# Patient Record
Sex: FEMALE | Race: BLACK OR AFRICAN AMERICAN | NOT HISPANIC OR LATINO | Employment: OTHER | URBAN - METROPOLITAN AREA
[De-identification: names, ages, dates, MRNs, and addresses within clinical notes are randomized per-mention and may not be internally consistent; named-entity substitution may affect disease eponyms.]

---

## 2022-01-26 DIAGNOSIS — Z11.59 ENCOUNTER FOR SCREENING FOR OTHER VIRAL DISEASES: Primary | ICD-10-CM

## 2022-02-04 RX ORDER — FOLIC ACID 1 MG/1
1 TABLET ORAL
COMMUNITY
Start: 2021-07-19

## 2022-02-04 RX ORDER — DIVALPROEX SODIUM 500 MG/1
500 TABLET, DELAYED RELEASE ORAL 2 TIMES DAILY
COMMUNITY

## 2022-02-04 RX ORDER — OMEPRAZOLE 40 MG/1
40 CAPSULE, DELAYED RELEASE ORAL
COMMUNITY
Start: 2022-01-23

## 2022-02-04 RX ORDER — SELENIUM 50 MCG
1 TABLET ORAL DAILY
COMMUNITY

## 2022-02-04 RX ORDER — CHOLECALCIFEROL (VITAMIN D3) 50 MCG
1 TABLET ORAL DAILY
COMMUNITY

## 2022-02-04 RX ORDER — IBUPROFEN 800 MG/1
800 TABLET, FILM COATED ORAL
COMMUNITY
Start: 2020-02-17

## 2022-02-04 RX ORDER — LISINOPRIL 10 MG/1
1 TABLET ORAL 2 TIMES DAILY
COMMUNITY
Start: 2021-07-19

## 2022-02-04 RX ORDER — CETIRIZINE HYDROCHLORIDE 10 MG/1
10 TABLET ORAL DAILY
COMMUNITY

## 2022-02-04 RX ORDER — ONDANSETRON 8 MG/1
8 TABLET, ORALLY DISINTEGRATING ORAL
COMMUNITY
Start: 2021-07-19

## 2022-02-04 RX ORDER — LORAZEPAM 1 MG/1
1 TABLET ORAL
COMMUNITY
Start: 2021-11-15

## 2022-02-04 RX ORDER — ZOLPIDEM TARTRATE 10 MG/1
10 TABLET ORAL DAILY
COMMUNITY
Start: 2021-11-15

## 2022-02-04 RX ORDER — NAPROXEN SOD/DIPHENHYDRAMINE 220MG-25MG
1 TABLET ORAL DAILY PRN
COMMUNITY

## 2022-02-04 RX ORDER — DOCUSATE SODIUM 100 MG/1
100 CAPSULE, LIQUID FILLED ORAL
COMMUNITY
Start: 2021-01-13

## 2022-02-07 ENCOUNTER — ANESTHESIA (OUTPATIENT)
Dept: SURGERY | Facility: CLINIC | Age: 37
End: 2022-02-07
Payer: MEDICARE

## 2022-02-07 ENCOUNTER — HOSPITAL ENCOUNTER (OUTPATIENT)
Facility: CLINIC | Age: 37
Discharge: HOME | End: 2022-02-07
Attending: ORTHOPAEDIC SURGERY | Admitting: ORTHOPAEDIC SURGERY
Payer: MEDICARE

## 2022-02-07 ENCOUNTER — ANESTHESIA EVENT (OUTPATIENT)
Dept: SURGERY | Facility: CLINIC | Age: 37
End: 2022-02-07
Payer: MEDICARE

## 2022-02-07 VITALS
HEART RATE: 71 BPM | OXYGEN SATURATION: 96 % | WEIGHT: 233.5 LBS | TEMPERATURE: 97.3 F | RESPIRATION RATE: 15 BRPM | HEIGHT: 64 IN | DIASTOLIC BLOOD PRESSURE: 83 MMHG | SYSTOLIC BLOOD PRESSURE: 125 MMHG | BODY MASS INDEX: 39.86 KG/M2

## 2022-02-07 DIAGNOSIS — Z98.890 S/P RECONSTRUCTION OF ANTERIOR CRUCIATE LIGAMENT: Primary | ICD-10-CM

## 2022-02-07 DIAGNOSIS — S83.519A ACL TEAR: ICD-10-CM

## 2022-02-07 LAB — HCG UR QL: NEGATIVE

## 2022-02-07 PROCEDURE — 250N000009 HC RX 250: Performed by: ANESTHESIOLOGY

## 2022-02-07 PROCEDURE — 370N000017 HC ANESTHESIA TECHNICAL FEE, PER MIN: Performed by: ORTHOPAEDIC SURGERY

## 2022-02-07 PROCEDURE — 258N000003 HC RX IP 258 OP 636: Performed by: ANESTHESIOLOGY

## 2022-02-07 PROCEDURE — 258N000003 HC RX IP 258 OP 636: Performed by: NURSE ANESTHETIST, CERTIFIED REGISTERED

## 2022-02-07 PROCEDURE — 999N000141 HC STATISTIC PRE-PROCEDURE NURSING ASSESSMENT: Performed by: ORTHOPAEDIC SURGERY

## 2022-02-07 PROCEDURE — 250N000009 HC RX 250: Performed by: NURSE ANESTHETIST, CERTIFIED REGISTERED

## 2022-02-07 PROCEDURE — 81025 URINE PREGNANCY TEST: CPT | Performed by: ORTHOPAEDIC SURGERY

## 2022-02-07 PROCEDURE — C1713 ANCHOR/SCREW BN/BN,TIS/BN: HCPCS | Performed by: ORTHOPAEDIC SURGERY

## 2022-02-07 PROCEDURE — 360N000077 HC SURGERY LEVEL 4, PER MIN: Performed by: ORTHOPAEDIC SURGERY

## 2022-02-07 PROCEDURE — 250N000009 HC RX 250: Performed by: ORTHOPAEDIC SURGERY

## 2022-02-07 PROCEDURE — 250N000025 HC SEVOFLURANE, PER MIN: Performed by: ORTHOPAEDIC SURGERY

## 2022-02-07 PROCEDURE — 250N000011 HC RX IP 250 OP 636: Performed by: ORTHOPAEDIC SURGERY

## 2022-02-07 PROCEDURE — 250N000011 HC RX IP 250 OP 636: Performed by: NURSE ANESTHETIST, CERTIFIED REGISTERED

## 2022-02-07 PROCEDURE — 250N000013 HC RX MED GY IP 250 OP 250 PS 637: Performed by: ORTHOPAEDIC SURGERY

## 2022-02-07 PROCEDURE — 250N000011 HC RX IP 250 OP 636: Performed by: REGISTERED NURSE

## 2022-02-07 PROCEDURE — 710N000009 HC RECOVERY PHASE 1, LEVEL 1, PER MIN: Performed by: ORTHOPAEDIC SURGERY

## 2022-02-07 PROCEDURE — 710N000012 HC RECOVERY PHASE 2, PER MINUTE: Performed by: ORTHOPAEDIC SURGERY

## 2022-02-07 PROCEDURE — 258N000001 HC RX 258: Performed by: ORTHOPAEDIC SURGERY

## 2022-02-07 PROCEDURE — 272N000001 HC OR GENERAL SUPPLY STERILE: Performed by: ORTHOPAEDIC SURGERY

## 2022-02-07 DEVICE — IMPLANTABLE DEVICE: Type: IMPLANTABLE DEVICE | Site: KNEE | Status: FUNCTIONAL

## 2022-02-07 RX ORDER — CELECOXIB 200 MG/1
400 CAPSULE ORAL ONCE
Status: COMPLETED | OUTPATIENT
Start: 2022-02-07 | End: 2022-02-07

## 2022-02-07 RX ORDER — HYDROMORPHONE HCL IN WATER/PF 6 MG/30 ML
0.2 PATIENT CONTROLLED ANALGESIA SYRINGE INTRAVENOUS EVERY 5 MIN PRN
Status: DISCONTINUED | OUTPATIENT
Start: 2022-02-07 | End: 2022-02-07 | Stop reason: HOSPADM

## 2022-02-07 RX ORDER — OXYCODONE AND ACETAMINOPHEN 5; 325 MG/1; MG/1
1 TABLET ORAL
Status: DISCONTINUED | OUTPATIENT
Start: 2022-02-07 | End: 2022-02-07 | Stop reason: HOSPADM

## 2022-02-07 RX ORDER — PROPOFOL 10 MG/ML
INJECTION, EMULSION INTRAVENOUS CONTINUOUS PRN
Status: DISCONTINUED | OUTPATIENT
Start: 2022-02-07 | End: 2022-02-07

## 2022-02-07 RX ORDER — EPINEPHRINE 1 MG/ML
INJECTION, SOLUTION INTRAMUSCULAR; SUBCUTANEOUS
Status: DISCONTINUED
Start: 2022-02-07 | End: 2022-02-07 | Stop reason: HOSPADM

## 2022-02-07 RX ORDER — ONDANSETRON 4 MG/1
4 TABLET, ORALLY DISINTEGRATING ORAL EVERY 30 MIN PRN
Status: DISCONTINUED | OUTPATIENT
Start: 2022-02-07 | End: 2022-02-07 | Stop reason: HOSPADM

## 2022-02-07 RX ORDER — ONDANSETRON 2 MG/ML
4 INJECTION INTRAMUSCULAR; INTRAVENOUS EVERY 30 MIN PRN
Status: DISCONTINUED | OUTPATIENT
Start: 2022-02-07 | End: 2022-02-07 | Stop reason: HOSPADM

## 2022-02-07 RX ORDER — DEXAMETHASONE SODIUM PHOSPHATE 4 MG/ML
INJECTION, SOLUTION INTRA-ARTICULAR; INTRALESIONAL; INTRAMUSCULAR; INTRAVENOUS; SOFT TISSUE PRN
Status: DISCONTINUED | OUTPATIENT
Start: 2022-02-07 | End: 2022-02-07

## 2022-02-07 RX ORDER — FENTANYL CITRATE 0.05 MG/ML
25 INJECTION, SOLUTION INTRAMUSCULAR; INTRAVENOUS EVERY 5 MIN PRN
Status: DISCONTINUED | OUTPATIENT
Start: 2022-02-07 | End: 2022-02-07 | Stop reason: HOSPADM

## 2022-02-07 RX ORDER — OXYCODONE HYDROCHLORIDE 5 MG/1
5-10 TABLET ORAL EVERY 4 HOURS PRN
Qty: 26 TABLET | Refills: 0 | Status: SHIPPED | OUTPATIENT
Start: 2022-02-07

## 2022-02-07 RX ORDER — ROPIVACAINE HYDROCHLORIDE 5 MG/ML
INJECTION, SOLUTION EPIDURAL; INFILTRATION; PERINEURAL
Status: DISCONTINUED
Start: 2022-02-07 | End: 2022-02-07 | Stop reason: HOSPADM

## 2022-02-07 RX ORDER — LIDOCAINE HYDROCHLORIDE 20 MG/ML
INJECTION, SOLUTION INFILTRATION; PERINEURAL PRN
Status: DISCONTINUED | OUTPATIENT
Start: 2022-02-07 | End: 2022-02-07

## 2022-02-07 RX ORDER — ALBUTEROL SULFATE 90 UG/1
2 AEROSOL, METERED RESPIRATORY (INHALATION) EVERY 6 HOURS PRN
COMMUNITY

## 2022-02-07 RX ORDER — OXYCODONE HYDROCHLORIDE 5 MG/1
5 TABLET ORAL EVERY 4 HOURS PRN
Status: DISCONTINUED | OUTPATIENT
Start: 2022-02-07 | End: 2022-02-07 | Stop reason: HOSPADM

## 2022-02-07 RX ORDER — CEFAZOLIN SODIUM/WATER 2 G/20 ML
2 SYRINGE (ML) INTRAVENOUS SEE ADMIN INSTRUCTIONS
Status: DISCONTINUED | OUTPATIENT
Start: 2022-02-07 | End: 2022-02-07 | Stop reason: HOSPADM

## 2022-02-07 RX ORDER — PROPOFOL 10 MG/ML
INJECTION, EMULSION INTRAVENOUS PRN
Status: DISCONTINUED | OUTPATIENT
Start: 2022-02-07 | End: 2022-02-07

## 2022-02-07 RX ORDER — FENTANYL CITRATE 50 UG/ML
INJECTION, SOLUTION INTRAMUSCULAR; INTRAVENOUS PRN
Status: DISCONTINUED | OUTPATIENT
Start: 2022-02-07 | End: 2022-02-07

## 2022-02-07 RX ORDER — LIDOCAINE HYDROCHLORIDE 10 MG/ML
INJECTION, SOLUTION INFILTRATION; PERINEURAL
Status: DISCONTINUED
Start: 2022-02-07 | End: 2022-02-07 | Stop reason: HOSPADM

## 2022-02-07 RX ORDER — SODIUM CHLORIDE, SODIUM LACTATE, POTASSIUM CHLORIDE, CALCIUM CHLORIDE 600; 310; 30; 20 MG/100ML; MG/100ML; MG/100ML; MG/100ML
INJECTION, SOLUTION INTRAVENOUS CONTINUOUS
Status: DISCONTINUED | OUTPATIENT
Start: 2022-02-07 | End: 2022-02-07 | Stop reason: HOSPADM

## 2022-02-07 RX ORDER — CEFAZOLIN SODIUM/WATER 2 G/20 ML
2 SYRINGE (ML) INTRAVENOUS
Status: COMPLETED | OUTPATIENT
Start: 2022-02-07 | End: 2022-02-07

## 2022-02-07 RX ORDER — ACETAMINOPHEN 325 MG/1
975 TABLET ORAL ONCE
Status: COMPLETED | OUTPATIENT
Start: 2022-02-07 | End: 2022-02-07

## 2022-02-07 RX ORDER — ONDANSETRON 2 MG/ML
INJECTION INTRAMUSCULAR; INTRAVENOUS PRN
Status: DISCONTINUED | OUTPATIENT
Start: 2022-02-07 | End: 2022-02-07

## 2022-02-07 RX ORDER — MAGNESIUM HYDROXIDE 1200 MG/15ML
LIQUID ORAL PRN
Status: DISCONTINUED | OUTPATIENT
Start: 2022-02-07 | End: 2022-02-07 | Stop reason: HOSPADM

## 2022-02-07 RX ADMIN — PHENYLEPHRINE HYDROCHLORIDE 100 MCG: 10 INJECTION INTRAVENOUS at 11:16

## 2022-02-07 RX ADMIN — ROPIVACAINE HYDROCHLORIDE 20 ML: 5 INJECTION, SOLUTION EPIDURAL; INFILTRATION; PERINEURAL at 10:35

## 2022-02-07 RX ADMIN — PROPOFOL 200 MG: 10 INJECTION, EMULSION INTRAVENOUS at 10:58

## 2022-02-07 RX ADMIN — LIDOCAINE HYDROCHLORIDE 100 MG: 20 INJECTION, SOLUTION INFILTRATION; PERINEURAL at 10:58

## 2022-02-07 RX ADMIN — DEXAMETHASONE SODIUM PHOSPHATE 4 MG: 4 INJECTION, SOLUTION INTRA-ARTICULAR; INTRALESIONAL; INTRAMUSCULAR; INTRAVENOUS; SOFT TISSUE at 11:08

## 2022-02-07 RX ADMIN — CELECOXIB 400 MG: 200 CAPSULE ORAL at 09:27

## 2022-02-07 RX ADMIN — Medication 8 MCG: at 13:55

## 2022-02-07 RX ADMIN — Medication 2 G: at 10:50

## 2022-02-07 RX ADMIN — HYDROMORPHONE HYDROCHLORIDE 0.5 MG: 1 INJECTION, SOLUTION INTRAMUSCULAR; INTRAVENOUS; SUBCUTANEOUS at 14:03

## 2022-02-07 RX ADMIN — SODIUM CHLORIDE, POTASSIUM CHLORIDE, SODIUM LACTATE AND CALCIUM CHLORIDE: 600; 310; 30; 20 INJECTION, SOLUTION INTRAVENOUS at 14:16

## 2022-02-07 RX ADMIN — SODIUM CHLORIDE, POTASSIUM CHLORIDE, SODIUM LACTATE AND CALCIUM CHLORIDE: 600; 310; 30; 20 INJECTION, SOLUTION INTRAVENOUS at 10:03

## 2022-02-07 RX ADMIN — Medication 4 MCG: at 14:18

## 2022-02-07 RX ADMIN — ONDANSETRON 4 MG: 2 INJECTION INTRAMUSCULAR; INTRAVENOUS at 14:25

## 2022-02-07 RX ADMIN — Medication 8 MCG: at 14:16

## 2022-02-07 RX ADMIN — PHENYLEPHRINE HYDROCHLORIDE 100 MCG: 10 INJECTION INTRAVENOUS at 12:22

## 2022-02-07 RX ADMIN — ACETAMINOPHEN 975 MG: 325 TABLET, FILM COATED ORAL at 09:27

## 2022-02-07 RX ADMIN — FENTANYL CITRATE 100 MCG: 50 INJECTION, SOLUTION INTRAMUSCULAR; INTRAVENOUS at 10:58

## 2022-02-07 RX ADMIN — PHENYLEPHRINE HYDROCHLORIDE 100 MCG: 10 INJECTION INTRAVENOUS at 12:04

## 2022-02-07 RX ADMIN — PROPOFOL 50 MCG/KG/MIN: 10 INJECTION, EMULSION INTRAVENOUS at 10:58

## 2022-02-07 RX ADMIN — FENTANYL CITRATE 50 MCG: 50 INJECTION, SOLUTION INTRAMUSCULAR; INTRAVENOUS at 14:39

## 2022-02-07 ASSESSMENT — MIFFLIN-ST. JEOR: SCORE: 1744.15

## 2022-02-07 ASSESSMENT — LIFESTYLE VARIABLES: TOBACCO_USE: 0

## 2022-02-07 ASSESSMENT — ENCOUNTER SYMPTOMS: SEIZURES: 1

## 2022-02-07 NOTE — ANESTHESIA PROCEDURE NOTES
Adductor canal and Femoral Procedure Note    Pre-Procedure   Staff -        Anesthesiologist:  Carlos Hui MD       Performed By: Anesthesiologist       Location: pre-op       Pre-Anesthestic Checklist: patient identified, IV checked, site marked, risks and benefits discussed, informed consent, monitors and equipment checked, pre-op evaluation, at physician/surgeon's request and post-op pain management  Timeout:       Correct Patient: Yes        Correct Procedure: Yes        Correct Site: Yes        Correct Position: Yes        Correct Laterality: Yes        Site Marked: Yes  Procedure Documentation  Procedure: Adductor canal, Femoral       Patient Position: supine       Patient Prep/Sterile Barriers: mask, no-touch technique utilized       Skin prep: Chloraprep       Local skin infiltrated with 3 mL of 1% lidocaine.        Needle Type: insulated and short bevel       Needle Gauge: 20.        Needle Length (millimeters): 100        Ultrasound guided       1. Ultrasound was used to identify targeted nerve, plexus, vascular marker, or fascial plane and place a needle adjacent to it in real-time.       2. Ultrasound was used to visualize the spread of anesthetic in close proximity to the above referenced structure.       3. A permanent image is entered into the patient's record.    Assessment/Narrative         The placement was negative for: blood aspirated, painful injection and site bleeding       Paresthesias: No.    Test dose of mL at.         Test dose negative, 3 minutes after injection, for signs of intravascular, subdural, or intrathecal injection.     Bolus given via needle..        Secured via.        Insertion/Infusion Method: Single Shot       Complications: none    Medication(s) Administered   Ropivacaine 0.5% w/ 1:400K Epi (Injection), 20 mL   Comments:  20 cc of 0.5% Ropivacaine with epinephrine (1:400K) injected on the anterior side of the femoral artery, in close proximity to the femoral nerve  branches via the adductor canal approach.      Ultrasound Interpretation, Peripheral Nerve Block    1. Under ultrasound guidance, the needle was inserted and placed in close proximity to the target nerve(s).  2. Ultrasound was also used to visualize the spread of the anesthetic in close proximity to the nerve(s) being blocked.  Local anesthetic was administered in incremental doses, with intermittent negative aspiration.    3. The nerve(s) appeared anatomically normal.  4. There were no apparent abnormal pathological findings.  5. A permanent ultrasound image was saved in the patient's record.    Pt tolerated well.    No complications.      The surgeon has given a verbal order transferring care of this patient to me for the performance of a regional analgesia block for post-op pain control. It is requested of me because I am uniquely trained and qualified to perform this block and the surgeon is neither trained nor qualified to perform this procedure.

## 2022-02-07 NOTE — BRIEF OP NOTE
[facility]    Brief Operative Note    Pre-operative diagnosis: ACL tear [S83.519A]  Post-operative diagnosis Chronic ACL  tear with recurrent instability and chronic complex unstable tears of the medial and lateral menisci    Procedure: Procedure(s):  LEFT KNEE ARTHROSCOPIC ASSISTED ANTERIOR CRUCIATE LIGAMENT RECONSTRUCTION  BONE-PATELLA-BONE AUTOGRAFT  PARTIAL MEDIAL AND LATERAL MENISCECTOMY  Surgeon: Surgeon(s) and Role:     * Humberto Panchal MD - Primary     * Dominic Thomas - Assisting  Anesthesia: Other   Estimated Blood Loss: Less than 50 ml    Drains: None  Specimens: * No specimens in log *  Findings:   None.  Complications: None.  Implants:   Implant Name Type Inv. Item Serial No.  Lot No. LRB No. Used Action   SOFTSILK 1.5 SCREW, 7MM X 20MM    [employer] [id number] Left 1 Implanted   SOFTSILK 1.5 SCRW, 8MM X 20MM     [employer] [id number] Left 1 Implanted

## 2022-02-07 NOTE — OR NURSING
Discharge instructions reviewed with friend Milton via phone who will be with her tonight. Printed form sent with patient. Neither have any further questions. Waiting for Medical Transport to take her home. Milton confirms he will be there to accept patient.

## 2022-02-07 NOTE — ANESTHESIA PREPROCEDURE EVALUATION
Prior to Admission medications    Medication Sig Start Date End Date Taking? Authorizing Provider   acetaminophen-codeine (TYLENOL #3) 300-30 MG per tablet Take 1-2 tablets by mouth every 4 hours as needed for moderate pain   Yes Reported, Patient   albuterol (PROAIR HFA/PROVENTIL HFA/VENTOLIN HFA) 108 (90 Base) MCG/ACT inhaler Inhale 2 puffs into the lungs every 6 hours as needed for shortness of breath / dyspnea or wheezing   Yes Reported, Patient   cetirizine (ZYRTEC) 10 MG tablet Take 10 mg by mouth daily   Yes Reported, Patient   divalproex sodium delayed-release (DEPAKOTE) 500 MG DR tablet Take 500 mg by mouth 2 times daily   Yes Reported, Patient   docusate sodium (DSS) 100 MG capsule Take 100 mg by mouth 1/13/21  Yes Reported, Patient   FIBER SELECT GUMMIES PO Take 2 chewable tablet by mouth daily   Yes Reported, Patient   folic acid (FOLVITE) 1 MG tablet Take 1 mg by mouth 7/19/21  Yes Reported, Patient   ibuprofen (ADVIL/MOTRIN) 800 MG tablet Take 800 mg by mouth 2/17/20  Yes Reported, Patient   Lactobacillus (ACIDOPHILUS) CAPS Take 1 capsule by mouth daily   Yes Reported, Patient   lisinopril (ZESTRIL) 10 MG tablet Take 1 tablet by mouth 2 times daily 7/19/21  Yes Reported, Patient   LORazepam (ATIVAN) 1 MG tablet Take 1 mg by mouth 11/15/21  Yes Reported, Patient   Naproxen Sod-diphenhydrAMINE (ALEVE PM) 220-25 MG TABS Take 1 tablet by mouth daily as needed   Yes Reported, Patient   omeprazole (PRILOSEC) 40 MG DR capsule Take 40 mg by mouth 1/23/22  Yes Reported, Patient   ondansetron (ZOFRAN-ODT) 8 MG ODT tab Place 8 mg under the tongue 7/19/21  Yes Reported, Patient   sertraline (ZOLOFT) 50 MG tablet Take 50 mg by mouth daily    Yes Reported, Patient   vitamin D3 (CHOLECALCIFEROL) 50 mcg (2000 units) tablet Take 1 tablet by mouth daily   Yes Reported, Patient   zolpidem (AMBIEN) 10 MG tablet Take 10 mg by mouth daily 11/15/21  Yes Reported, Patient     Current Facility-Administered Medications Ordered  in Epic   Medication Dose Route Frequency Last Rate Last Admin     ceFAZolin Sodium (ANCEF) injection 2 g  2 g Intravenous Pre-Op/Pre-procedure x 1 dose         ceFAZolin Sodium (ANCEF) injection 2 g  2 g Intravenous See Admin Instructions         fentaNYL (PF) (SUBLIMAZE) injection 25 mcg  25 mcg Intravenous Q5 Min PRN         HYDROmorphone (DILAUDID) injection 0.2 mg  0.2 mg Intravenous Q5 Min PRN         lactated ringers infusion   Intravenous Continuous         ondansetron (ZOFRAN-ODT) ODT tab 4 mg  4 mg Oral Q30 Min PRN        Or     ondansetron (ZOFRAN) injection 4 mg  4 mg Intravenous Q30 Min PRN         oxyCODONE (ROXICODONE) tablet 5 mg  5 mg Oral Q4H PRN         prochlorperazine (COMPAZINE) injection 5 mg  5 mg Intravenous Q6H PRN        Or     prochlorperazine (COMPAZINE) injection 10 mg  10 mg Intravenous Q6H PRN         No current [facility]-ordered outpatient medications on file.       lactated ringers       No results for input(s): ABO, RH in the last 32870 hours.  Recent Labs   Lab Test 22  0900   HCG Negative     No results found for this or any previous visit (from the past 744 hour(s)).Anesthesia Pre-Procedure Evaluation    Patient: Megan Mcdonald   MRN: [id number] : 1985        Preoperative Diagnosis: ACL tear [S83.519A]    Procedure : Procedure(s):  LEFT KNEE ARTHROSCOPIC ASSISTED ANTERIOR CRUCIATE LIGAMENT RECONSTRUCTION  BONE-PATELLA-BONE AUTOGRAFT  PARTIAL MEDIAL AND LATERAL MENISCECTOMY VERSUS POSSIBLE REPAIR          No past medical history on file.   Past Surgical History:   Procedure Laterality Date     OTHER SURGICAL HISTORY      right acl repair      Allergies   Allergen Reactions     Abilify [Aripiprazole] Other (See Comments)     Seizures     Haldol [Haloperidol] Other (See Comments)     seizure     Clindamycin Rash      Social History     Tobacco Use     Smoking status: Never Smoker     Smokeless tobacco: Not on file   Substance Use Topics     Alcohol use: No      Wt  Readings from Last 1 Encounters:   02/07/22 105.9 kg (233 lb 8 oz)        Anesthesia Evaluation   Pt has had prior anesthetic.     No history of anesthetic complications       ROS/MED HX  ENT/Pulmonary:    (-) tobacco use   Neurologic:     (+) seizures, last seizure: remote, features: grand mal,     Cardiovascular:  - neg cardiovascular ROS     METS/Exercise Tolerance:     Hematologic:       Musculoskeletal:       GI/Hepatic:     (+) GERD, Asymptomatic on medication,     Renal/Genitourinary:       Endo:       Psychiatric/Substance Use:       Infectious Disease:       Malignancy:       Other:            Physical Exam    Airway        Mallampati: I    Neck ROM: full     Respiratory Devices and Support         Dental  no notable dental history         Cardiovascular   cardiovascular exam normal          Pulmonary   pulmonary exam normal                OUTSIDE LABS:  CBC:   Lab Results   Component Value Date    WBC 5.2 03/30/2015    HGB 13.6 03/30/2015    HCT 38.9 03/30/2015     03/30/2015     BMP:   Lab Results   Component Value Date     03/30/2015    POTASSIUM 3.8 03/30/2015    CHLORIDE 107 03/30/2015    CO2 27 03/30/2015    BUN 12 03/30/2015    CR 0.75 03/30/2015    GLC 71 03/30/2015     COAGS: No results found for: PTT, INR, FIBR  POC:   Lab Results   Component Value Date    HCG Negative 02/07/2022     HEPATIC: No results found for: ALBUMIN, PROTTOTAL, ALT, AST, GGT, ALKPHOS, BILITOTAL, BILIDIRECT, MEHRAN  OTHER:   Lab Results   Component Value Date    NAVEEN 8.5 03/30/2015       Anesthesia Plan    ASA Status:  2   NPO Status:  NPO Appropriate    Anesthesia Type: General.     - Airway: LMA   Induction: Intravenous.   Maintenance: Balanced.        Consents    Anesthesia Plan(s) and associated risks, benefits, and realistic alternatives discussed. Questions answered and patient/representative(s) expressed understanding.    - Discussed:     - Discussed with:  Patient         Postoperative Care       PONV  prophylaxis: Ondansetron (or other 5HT-3)     Comments:    Other Comments: Pt requesting general anesthesia. SAB was offered.            Carlos Hui MD

## 2022-02-07 NOTE — OP NOTE
Procedure Date: 02/07/2022    PREOPERATIVE DIAGNOSES:    1.  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  2.  Chronic unstable posterior horn tear, medial meniscus.  3.  Chronic unstable posterior horn tear, lateral meniscus.    PROCEDURES:    1.  Diagnostic arthroscopy, left knee.  2.  Arthroscopic partial medial meniscectomy.  3.  Arthroscopic partial lateral meniscectomy.  4.  Arthroscopic-assisted anterior cruciate ligament reconstruction.  Bone-patellar tendon-bone autograft.    SURGEON:  Humberto Panchal MD    FIRST ASSISTANT:  Dominic Thomas, orthopedic assistant.    IMPLANTS:  [employer] interference screws x2:  7 x 20 -- femur, 8 x 20 -- tibia.    INDICATIONS FOR PROCEDURE:  Megan Mcdonald is a 36-year-old woman who initially injured her left knee 5 years ago, sustaining an anterior cruciate ligament rupture, as well as medial and lateral meniscus tears.  She has had persistent pain, and recurrent instability, as well as prominent mechanical symptoms including catching and locking for the last 5 years.  She has considered a surgical intervention on multiple occasions over the last 5 years, but these were always discontinued due to either social or medical reasons.  She is now ready to consider surgical intervention.  She has undergone an extensive conservative treatment approach, but unfortunately her symptoms have persisted.  Indications, risks, goals, and expectations of left knee surgery were reviewed in detail preoperatively.    DESCRIPTION OF PROCEDURE:  Following induction of an adductor canal femoral nerve block as well as general anesthesia, the left lower extremity was prepped and draped in the usual sterile fashion.  Examination under anesthesia of both lower extremities did reveal a positive Lachman and pivot shift test on the left.  Lachman and pivot shift tests were negative on the right.  Of note, she is 7 years status post right knee ACL  "reconstruction performed by Dr. Dominic Maher in 2014.    An anterolateral portal was established for the arthroscope cannula.  A superior medial approach was used for the Veress needle for outflow.  An anteromedial portal was established under direct vision using a spinal needle followed by an 11-blade knife followed by arthroscopy probe.  Diagnostic arthroscopy was begun in the suprapatellar pouch.  The articular cartilage, surfaces of the patella and trochlea were normal.  The medial and lateral gutters were free of loose bodies.  The scope was brought down the lateral gutter and back to the popliteal recess, which was normal.  Intercondylar notch was examined.  There was chronic full thickness ACL rupture with a positive \"empty wall sign.\"  There remained some residual ligament attached to the tibial insertion, but otherwise there was essentially absence of the proximal 1/2 of the anterior cruciate ligament.  The posterior cruciate ligament was intact.    The medial compartment was examined and probed.  There was mild grade 2 chondromalacia involving approximately 1 cm in diameter of the medial femoral condyle.  A light medial femoral chondroplasty was performed to improve visualization.  There was a chronic displaced horizontal flap tear that remained attached to the mid body.  In addition, there was complex tearing involving both radial and horizontal components at the posterior horn.  An arthroscopic partial medial meniscectomy was then performed using the Acufex upbiter, as well as curved 4.5 mm synovial resector.  All loose unstable flaps were trimmed.    The knee was then placed in a figure-of-four position and the lateral compartment was examined and probed.  There was a grossly unstable posterior horn tear with both radial and horizontal components.  This resulted in a large unstable flap that was displaceable anteriorly into the compartment.  Arthroscopic partial lateral meniscectomy was performed, " essentially removing most of the posterior horn.  The mid body remained stable to probing just anterior to the popliteus tendon.  This was then preserved as was the anterior horn.  The articular cartilage surfaces of the lateral compartment were normal.    The intercondylar notch was then debrided, removing synovitis and hypertrophic ligamentum mucosum.  This revealed significant intercondylar notch stenosis.  A 5.0 mm spherical bur was then used to perform a notchplasty on the lateral wall of the intercondylar notch, removing 3-4 mm of bone anteriorly and then tapering posteriorly so that no bone was removed from the posterior one half of the intercondylar notch.  The over the top position was carefully identified with an arthroscopy probe.    Attention was then turned to the graft harvest.  The leg was exsanguinated with an Esmarch bandage and the tourniquet inflated to 275 mmHg.  A linear incision was made from the inferior pole of the patella to the medial border of the tibial tubercle.  Full thickness skin flaps were elevated above the patellar tendon and the patella.  The peritenon was incised sharply over the patellar tendon and carefully reflected.  A 10 mm wide strip of patellar tendon from the central aspect was harvested sharply.  Bone blocks were fashioned from the patella and tibial tubercle using a microsagittal saw.  The graft was carefully extracted using an osteotome and taken to the back table for preparation.  An antibiotic soaked sponge was placed in the knee to keep the soft tissues moist.    The graft was then prepared with a rongeur.  Bone was carefully preserved for later patellar defect grafting.  The patellar bone block fit nicely into a 10 mm sizing tube and the tibial tubercle bone block fit nicely into a 9 mm sizing tube.  Three drill holes were made in each bone block using an 0.054 K-wire and three #1 Ethibond sutures were placed through each bone block for graft passage.  The tendon  bone interface was marked with a sterile marking pen for the femoral bone block.  The graft was placed in an antibiotic-soaked sponge in a safe position on the back table.    The scope was then returned to the knee.  An accessory anteromedial portal was made beneath the subcutaneous flap.  This was then used for the Acufex tibial aimer.  This was then placed at the previously identified site for the tibial tunnel.  This was 2 mm anterior to the leading edge of the anterior horn of the lateral meniscus and bias to the medial side of the tibial footprint adjacent to the medial tibial spine.  The guide pin was then placed and confirmed in an anatomic position.  The tibial tunnel was then fashioned with the 10 mm rigid reamer.  Bone reamings were preserved for later patellar defect grafting.    The scope was then switched to the anteromedial portal.  The 9 mm pinpoint guide was placed in the anterolateral portal and on the lateral wall of the intercondylar notch.  This was placed to provide a 3-4 mm bone bridge posteriorly between the edge of the tunnel and the articular cartilage margin.  This was positioned proximally, so that 40% of the bone was visible proximal to the tibial tunnel and 60% of the bone proximal to the femoral tunnel and 60% remained visible distal to the femoral tunnel.  The awl was then used in the accessory anteromedial portal to make a small hole in the center of the pinpoint guide.  This was carefully inspected and confirmed in the anatomic position.  The flexible femoral guide pin was then placed in its guide and passed from distal to proximal out the lateral aspect of the knee.  The flexible femoral reamer was then used to fashion the femoral tunnel.  This was placed to a depth of 35-40 mm.  Bone reamings were irrigated from the knee.  The tunnel was carefully inspected with the scope in the anteromedial portal and revealed circumferential bone in an appropriate position.  The notching tool was  then used in the anteromedial portal to create a notch for the interference screw guide pin.  The passing sutures were then placed in the eyelet of the Beath pin and advanced from distal to proximal.  These were retrieved in the tibial tunnel and used to pass the graft also from distal to proximal.  The femoral bone block was countersunk approximately 1 mm up into the tunnel to improve the tibial bone block positioning within the tibial tunnel.  The femoral bone block was stabilized with a 7 x 20 mm cannulated interference screw.  This achieved excellent purchase.  The knee was cycled through multiple ranges of motion.  There was no sign of lateral wall or intercondylar roof impingement, including with the knee in full extension.  There was no pistoning of the tibial tunnel within the tibial bone block.  The knee was then placed in 5 degrees of flexion with a gentle posterior drawer on the knee to tension the posterior cruciate ligament.  The ACL graft was tensioned and then stabilized with an 8 x 20 mm interference screw in the tibial tunnel.  This achieved excellent purchase.  Lachman's test was now negative.    Autograft was used to graft the patellar donor site.  This was contained by closing the peritenon.  The patellar tendon and peritenon were then closed in separate layers using interrupted 2-0 Vicryl suture.  Subcutaneous tissue was closed with interrupted 2-0 Vicryl and skin with running 4-0 Monocryl in a subcuticular fashion, followed by Steri-Strips.  A sterile dressing was applied and the left lower extremity was placed into a knee immobilizer.    COMPLICATIONS:  None known.    ESTIMATED BLOOD LOSS:  10 mL    ANTIBIOTICS:  Ancef 2 grams IV preoperatively.    TOTAL TOURNIQUET TIME:  107 minutes.    The patient returned to the PACU in stable condition.    Humberto Panchal MD        D: 02/07/2022   T: 02/07/2022   MT: BRADY    Name:     SILVIA JARRETT  MRN:      [id number]        Account:         [id number]   :      1985           Procedure Date: 2022     Document: [id number]

## 2022-02-07 NOTE — ANESTHESIA POSTPROCEDURE EVALUATION
Patient: Megan Mcdonald    Procedure: Procedure(s):  LEFT KNEE ARTHROSCOPIC ASSISTED ANTERIOR CRUCIATE LIGAMENT RECONSTRUCTION  BONE-PATELLA-BONE AUTOGRAFT  PARTIAL MEDIAL AND LATERAL MENISCECTOMY       Diagnosis:ACL tear [S83.519A]  Diagnosis Additional Information: No value filed.    Anesthesia Type:  General    Note:  Disposition: Outpatient   Postop Pain Control: Uneventful            Sign Out: Well controlled pain   PONV: No   Neuro/Psych: Uneventful            Sign Out: Acceptable/Baseline neuro status   Airway/Respiratory: Uneventful            Sign Out: Acceptable/Baseline resp. status   CV/Hemodynamics: Uneventful            Sign Out: Acceptable CV status; No obvious hypovolemia; No obvious fluid overload   Other NRE: NONE   DID A NON-ROUTINE EVENT OCCUR? No           Last vitals:  Vitals Value Taken Time   /83 02/07/22 1600   Temp 36.3  C (97.3  F) 02/07/22 1600   Pulse 77 02/07/22 1611   Resp 0 02/07/22 1611   SpO2 97 % 02/07/22 1611   Vitals shown include unvalidated device data.    Electronically Signed By: Carlos Hui MD  February 7, 2022  4:24 PM

## 2022-02-07 NOTE — DISCHARGE INSTRUCTIONS
Today you were given 975 mg of Tylenol at 0930.  The recommended daily maximum dose is 4000 mg.     While you were at the hospital today you received Celebrex at 0930, an antiinflammatory medication similar to Ibuprofen.  You should not take other antiinflammatory medication, such as Ibuprofen, Motrin, Advil, Aleve, Naprosyn, etc, for at least 8 hours.       Per Dr. Panchal knee immobilizer and crutches for all transfers and movement until you see him for followup.      Same Day Surgery Discharge Instructions for  Sedation and General Anesthesia       It's not unusual to feel dizzy, light-headed or faint for up to 24 hours after surgery or while taking pain medication.  If you have these symptoms: sit for a few minutes before standing and have someone assist you when you get up to walk or use the bathroom.      You should rest and relax for the next 24 hours. We recommend you make arrangements to have an adult stay with you for at least 24 hours after your discharge.  Avoid hazardous and strenuous activity.      DO NOT DRIVE any vehicle or operate mechanical equipment for 24 hours following the end of your surgery.  Even though you may feel normal, your reactions may be affected by the medication you have received.      Do not drink alcoholic beverages for 24 hours following surgery.       Slowly progress to your regular diet as you feel able. It's not unusual to feel nauseated and/or vomit after receiving anesthesia.  If you develop these symptoms, drink clear liquids (apple juice, ginger ale, broth, 7-up, etc. ) until you feel better.  If your nausea and vomiting persists for 24 hours, please notify your surgeon.        All narcotic pain medications, along with inactivity and anesthesia, can cause constipation. Drinking plenty of liquids and increasing fiber intake will help.      For any questions of a medical nature, call your surgeon.      Do not make important decisions for 24 hours.      If you had general  anesthesia, you may have a sore throat for a couple of days related to the breathing tube used during surgery.  You may use Cepacol lozenges to help with this discomfort.  If it worsens or if you develop a fever, contact your surgeon.       If you feel your pain is not well managed with the pain medications prescribed by your surgeon, please contact your surgeon's office to let them know so they can address your concerns.       CoVid 19 Information    We want to give you information regarding Covid. Please consult your primary care provider with any questions you might have.     Patient who have symptoms (cough, fever, or shortness of breath), need to isolate for 7 days from when symptoms started OR 72 hours after fever resolves (without fever reducing medications) AND improvement of respiratory symptoms (whichever is longer).      Isolate yourself at home (in own room/own bathroom if possible)    Do Not allow any visitors    Do Not go to work or school    Do Not go to [place],  centers, shopping, or other public places.    Do Not shake hands.    Avoid close and intimate contact with others (hugging, kissing).    Follow CDC recommendations for household cleaning of frequently touched services.     After the initial 7 days, continue to isolate yourself from household members as much as possible. To continue decrease the risk of community spread and exposure, you and any members of your household should limit activities in public for 14 days after starting home isolation.     You can reference the following CDC link for helpful home isolation/care tips:  https://www.cdc.gov/coronavirus/2019-ncov/downloads/10Things.pdf    Protect Others:    Cover Your Mouth and Nose with a mask, disposable tissue or wash cloth to avoid spreading germs to others.    Wash your hands and face frequently with soap and water    Call Your Primary Doctor If: Breathing difficulty develops or you become worse.    For more information  "about COVID19 and options for caring for yourself at home, please visit the CDC website at https://www.cdc.gov/coronavirus/2019-ncov/about/steps-when-sick.html  For more options for care at [facility], please visit our website at [email or web address]        [facility]      DISCHARGE INSTRUCTIONS FOLLOWING   REGIONAL BLOCK ANESTHESIA      Numbness or lack of feeling in the arm/leg that was operated may last up to 24 hours.  The average time is usually 10-15 hours.  You may not be able to lift or move the arm or leg where the operation was by itself during that time.  Long-acting local anesthetic medicines were used to give you long-lasting pain relief.    Wear a sling until your arm is completely \"awake\"    Avoid bumping your arm, leg or foot while it is numb    Avoid extremes of hot or cold while it is numb    Remain quiet and restful the day of surgery.  Resume normal activities gradually over the next day or so as advise by your surgeon.    Do not drive or operate  Any machinery until your extremity is full  \"awake\"        You will have a tingling and prickly sensation in your arm/leg as the feeling begins to return; you can also expect some discomfort. The amount of discomfort is unpredictable, but if you have more pain that can be controlled with the pain medication you received, you should contact your surgeon.  Start to take your pain pills as soon as you start to feel any discomfort or pain.                  Reasons to contact your surgeon:    1. Signs of possible infection: Check your incision daily for redness, swelling, warmth, red streaks or foul drainage.   2. Elevated temperature.  3. Pain not controlled with pain medication and/or rest.   4. Uncontrolled nausea or vomiting.  5. Any questions or concerns.    "

## 2022-02-07 NOTE — ANESTHESIA CARE TRANSFER NOTE
Patient: Megan Mcdonald    Procedure: Procedure(s):  LEFT KNEE ARTHROSCOPIC ASSISTED ANTERIOR CRUCIATE LIGAMENT RECONSTRUCTION  BONE-PATELLA-BONE AUTOGRAFT  PARTIAL MEDIAL AND LATERAL MENISCECTOMY       Diagnosis: ACL tear [S83.519A]  Diagnosis Additional Information: No value filed.    Anesthesia Type:   General     Note:    Oropharynx: oropharynx clear of all foreign objects and spontaneously breathing  Level of Consciousness: drowsy  Oxygen Supplementation: face mask  Level of Supplemental Oxygen (L/min / FiO2): 6  Independent Airway: airway patency satisfactory and stable  Dentition: dentition unchanged  Vital Signs Stable: post-procedure vital signs reviewed and stable  Report to RN Given: handoff report given  Patient transferred to: Phase II  Comments: At end of procedure, spontaneous respirations, adequate tidal volumes, followed commands to voice, LMA removed atraumatically, oropharynx suctioned, airway patent after LMA removal. Oxygen via facemask at 6 liters per minute to PACU. Oxygen tubing connected to wall O2 in PACU, SpO2, NiBP, and EKG monitors and alarms on and functioning, Michael Hugger warmer connected to patient gown, report on patient's clinical status given to PACU RN, RN questions answered.  Handoff Report: Identifed the Patient, Identified the Reponsible Provider, Reviewed the pertinent medical history, Discussed the surgical course, Reviewed Intra-OP anesthesia mangement and issues during anesthesia, Set expectations for post-procedure period and Allowed opportunity for questions and acknowledgement of understanding      Vitals:  Vitals Value Taken Time   /76 02/07/22 1512   Temp 98    Pulse 73 02/07/22 1514   Resp 12 02/07/22 1514   SpO2 100 % 02/07/22 1514   Vitals shown include unvalidated device data.    Electronically Signed By: ASHLEY Mckeon CRNA  February 7, 2022  3:15 PM

## 2022-03-27 ENCOUNTER — HEALTH MAINTENANCE LETTER (OUTPATIENT)
Age: 37
End: 2022-03-27

## 2022-09-25 ENCOUNTER — HEALTH MAINTENANCE LETTER (OUTPATIENT)
Age: 37
End: 2022-09-25

## 2023-01-30 ENCOUNTER — HEALTH MAINTENANCE LETTER (OUTPATIENT)
Age: 38
End: 2023-01-30

## 2024-03-02 ENCOUNTER — HEALTH MAINTENANCE LETTER (OUTPATIENT)
Age: 39
End: 2024-03-02

## 2025-06-19 PROCEDURE — 88304 TISSUE EXAM BY PATHOLOGIST: CPT | Mod: TC,ORL | Performed by: PLASTIC SURGERY

## 2025-06-20 ENCOUNTER — LAB REQUISITION (OUTPATIENT)
Dept: LAB | Facility: CLINIC | Age: 40
End: 2025-06-20
Payer: MEDICARE

## 2025-06-24 LAB
PATH REPORT.COMMENTS IMP SPEC: NORMAL
PATH REPORT.COMMENTS IMP SPEC: NORMAL
PATH REPORT.FINAL DX SPEC: NORMAL
PATH REPORT.GROSS SPEC: NORMAL
PATH REPORT.MICROSCOPIC SPEC OTHER STN: NORMAL
PATH REPORT.RELEVANT HX SPEC: NORMAL
PHOTO IMAGE: NORMAL

## 2025-06-24 PROCEDURE — 88304 TISSUE EXAM BY PATHOLOGIST: CPT | Mod: 26 | Performed by: PATHOLOGY

## (undated) DEVICE — BUR ARTHREX COOLCUT BLURR 5.0MMX13CM AR-8500BL

## (undated) DEVICE — GLOVE PROTEXIS BLUE W/NEU-THERA 7.5  2D73EB75

## (undated) DEVICE — GLOVE PROTEGRITY MICRO 8 LATEX

## (undated) DEVICE — BLADE CLIPPER 4412A

## (undated) DEVICE — Device

## (undated) DEVICE — BNDG ELASTIC 6" DBL LENGTH UNSTERILE 6611-16

## (undated) DEVICE — PREP CHLORAPREP 26ML TINTED ORANGE  260815

## (undated) DEVICE — SOL NACL 0.9% IRRIG 3000ML BAG 2B7477

## (undated) DEVICE — PACK ACL SCV15ACFSB

## (undated) DEVICE — DRAPE IOBAN INCISE 23X17" 6650EZ

## (undated) DEVICE — SU MONOCRYL 4-0 PS-2 18" UND Y496G

## (undated) DEVICE — SYR 50ML LL W/O NDL 309653

## (undated) DEVICE — DRSG STERI STRIP 1/2X4" R1547

## (undated) DEVICE — SU VICRYL 2-0 CT-1 27" UND J259H

## (undated) DEVICE — PACK ARTHROSCOPY KNEE SOP15AKFSM

## (undated) DEVICE — BNDG ELASTIC 6"X5YDS UNSTERILE 6611-60

## (undated) DEVICE — PREP DURAPREP 26ML APL 8630

## (undated) DEVICE — NDL 19GA 1.5"

## (undated) DEVICE — NDL 22GA 1.5"

## (undated) DEVICE — ESU PENCIL W/HOLSTER E2350H

## (undated) DEVICE — BLADE SAW OSCILLATING STRYK MED 9.0X25X0.38MM 2296-003-111

## (undated) DEVICE — MANIFOLD NEPTUNE 4 PORT 700-20

## (undated) DEVICE — SYR 10ML SLIP TIP W/O NDL

## (undated) DEVICE — COVER TABLE POLY 65X90" 8186

## (undated) DEVICE — SYR 03ML LL W/O NDL 309657

## (undated) DEVICE — SOL WATER IRRIG 1000ML BOTTLE 2F7114

## (undated) DEVICE — ABLATOR ARTHREX APOLLO RF MP90 ASPIRATING 90DEG AR-9811

## (undated) DEVICE — NDL 19GA 1.5" FILTER 305200

## (undated) DEVICE — PEN MARKING W/RULER DYNJSM04

## (undated) DEVICE — WRAP EZY KNEE

## (undated) DEVICE — GOWN IMPERVIOUS SPECIALTY XL/XLONG 39049

## (undated) DEVICE — DRAPE STERI U 1015

## (undated) DEVICE — BUR ARTHREX COOLCUT DBL CUT CVD 4.0MMX130MM AR-8400CDCS

## (undated) DEVICE — GLOVE PROTEXIS POWDER FREE 8.0 ORTHOPEDIC 2D73ET80

## (undated) DEVICE — SU ETHIBOND 2 OS-4 30" X519H

## (undated) DEVICE — DRAPE MAYO STAND 23X54 8337

## (undated) DEVICE — SUCTION TIP YANKAUER STR K87

## (undated) DEVICE — SOL NACL 0.9% IRRIG 1000ML BOTTLE 2F7124

## (undated) DEVICE — TUBING SUCTION 12"X1/4" N612

## (undated) DEVICE — TUBING ARTHROSCOPY PUMP ARTHREX AR-6410

## (undated) DEVICE — SU MONOCRYL 4-0 PS-2 27" UND Y426H

## (undated) DEVICE — BUR ARTHREX COOLCUT BONE CUTTER 4.0MMX13CM AR-8400BC

## (undated) DEVICE — DRSG ADAPTIC 3X8" 6113

## (undated) DEVICE — DRAPE SHEET REV FOLD 3/4 9349

## (undated) DEVICE — SU ETHIBOND 1 CT-1 30" X425H

## (undated) DEVICE — SOL BENZOIN 0.5OZ

## (undated) DEVICE — DRSG STERI STRIP 1/2X4" B1557

## (undated) DEVICE — DRAPE CONVERTORS U-DRAPE 60X72" 8476

## (undated) DEVICE — CAST PADDING 6" STERILE 9046S

## (undated) DEVICE — LINEN TOWEL PACK X5 5464

## (undated) DEVICE — GLOVE PROTEXIS MICRO 7.0  2D73PM70

## (undated) DEVICE — SPONGE RAY-TEC 4X8" 7318

## (undated) DEVICE — CAST PADDING 6" UNSTERILE 9046

## (undated) RX ORDER — FENTANYL CITRATE 50 UG/ML
INJECTION, SOLUTION INTRAMUSCULAR; INTRAVENOUS
Status: DISPENSED
Start: 2022-02-07

## (undated) RX ORDER — ACETAMINOPHEN 325 MG/1
TABLET ORAL
Status: DISPENSED
Start: 2022-02-07

## (undated) RX ORDER — DEXAMETHASONE SODIUM PHOSPHATE 4 MG/ML
INJECTION, SOLUTION INTRA-ARTICULAR; INTRALESIONAL; INTRAMUSCULAR; INTRAVENOUS; SOFT TISSUE
Status: DISPENSED
Start: 2022-02-07

## (undated) RX ORDER — ONDANSETRON 2 MG/ML
INJECTION INTRAMUSCULAR; INTRAVENOUS
Status: DISPENSED
Start: 2022-02-07

## (undated) RX ORDER — HYDROMORPHONE HYDROCHLORIDE 1 MG/ML
INJECTION, SOLUTION INTRAMUSCULAR; INTRAVENOUS; SUBCUTANEOUS
Status: DISPENSED
Start: 2022-02-07

## (undated) RX ORDER — LIDOCAINE HYDROCHLORIDE 20 MG/ML
INJECTION, SOLUTION EPIDURAL; INFILTRATION; INTRACAUDAL; PERINEURAL
Status: DISPENSED
Start: 2022-02-07

## (undated) RX ORDER — PROPOFOL 10 MG/ML
INJECTION, EMULSION INTRAVENOUS
Status: DISPENSED
Start: 2022-02-07

## (undated) RX ORDER — CEFAZOLIN SODIUM/WATER 2 G/20 ML
SYRINGE (ML) INTRAVENOUS
Status: DISPENSED
Start: 2022-02-07

## (undated) RX ORDER — CELECOXIB 200 MG/1
CAPSULE ORAL
Status: DISPENSED
Start: 2022-02-07